# Patient Record
Sex: MALE | Race: WHITE | NOT HISPANIC OR LATINO | Employment: FULL TIME | URBAN - METROPOLITAN AREA
[De-identification: names, ages, dates, MRNs, and addresses within clinical notes are randomized per-mention and may not be internally consistent; named-entity substitution may affect disease eponyms.]

---

## 2018-02-09 DIAGNOSIS — Z20.821 ZIKA VIRUS EXPOSURE: Primary | ICD-10-CM

## 2018-02-17 LAB
ZIKA VIRUS, NAA, SERUM: NEGATIVE
ZIKA VIRUS, NAA, URINE: NEGATIVE

## 2019-08-09 ENCOUNTER — OFFICE VISIT (OUTPATIENT)
Dept: FAMILY MEDICINE CLINIC | Facility: CLINIC | Age: 39
End: 2019-08-09

## 2019-08-09 VITALS
HEART RATE: 76 BPM | TEMPERATURE: 97.6 F | SYSTOLIC BLOOD PRESSURE: 104 MMHG | BODY MASS INDEX: 29.73 KG/M2 | WEIGHT: 212.4 LBS | DIASTOLIC BLOOD PRESSURE: 76 MMHG | OXYGEN SATURATION: 99 % | HEIGHT: 71 IN

## 2019-08-09 DIAGNOSIS — L98.9 SKIN LESION: ICD-10-CM

## 2019-08-09 DIAGNOSIS — Z00.00 HEALTH CARE MAINTENANCE: Primary | ICD-10-CM

## 2019-08-09 PROCEDURE — 99385 PREV VISIT NEW AGE 18-39: CPT | Performed by: FAMILY MEDICINE

## 2019-08-09 NOTE — PROGRESS NOTES
Subjective   Suresh Zeng is a 38 y.o. male.         Chief Complaint   Patient presents with   • Establish Care     New pt   • Annual Exam        History of Present Illness     New patient.  Getting reestablished.    Here for annual complete physical examination.  He has 2 children.  He has not been exercising as much as he used to.  But he just bought a treadmill.  He is ready to lose some weight.  His wife wanted him to be reestablished with a doctor.  He states he used to drink alcohol more heavily.  Now he drinks about 10 drinks a week, usually just on the weekends.  His LFTs were previously very elevated.  I saw him for 5 years ago, I counseled alcohol cessation.    Family history of multiple myeloma.    His immunizations: Tdap is up-to-date.      Social History     Tobacco Use   • Smoking status: Former Smoker     Types: Cigarettes     Last attempt to quit: 2006     Years since quittin.6   • Smokeless tobacco: Never Used   Substance Use Topics   • Alcohol use: Yes     Alcohol/week: 6.0 oz     Types: 10 Standard drinks or equivalent per week   • Drug use: No     Immunization History   Administered Date(s) Administered   • Hepatitis B 2014   • Tdap 2014     Family History   Problem Relation Age of Onset   • Multiple myeloma Mother    • Irregular heart beat Father          The following portions of the patient's history were reviewed and updated as appropriate: allergies, current medications, past family history, past medical history, past social history, past surgical history and problem list.          Review of Systems   Constitutional: Negative.    HENT: Negative.    Respiratory: Negative.    Cardiovascular: Negative.    Gastrointestinal: Negative.  Negative for blood in stool.   Endocrine: Negative.    Genitourinary: Negative.  Negative for hematuria.   Musculoskeletal: Negative.    Skin: Negative.    Neurological: Negative.  Negative for headaches.   Psychiatric/Behavioral: Negative.   "  All other systems reviewed and are negative.      Objective   Blood pressure 104/76, pulse 76, temperature 97.6 °F (36.4 °C), temperature source Oral, height 180.3 cm (71\"), weight 96.3 kg (212 lb 6.4 oz), SpO2 99 %.  Physical Exam   Constitutional: He is oriented to person, place, and time. He appears well-developed and well-nourished. No distress.   HENT:   Head: Atraumatic.   Right Ear: Tympanic membrane, external ear and ear canal normal.   Left Ear: Tympanic membrane, external ear and ear canal normal.   Nose: Nose normal.   Mouth/Throat: Oropharynx is clear and moist. No oropharyngeal exudate.   Eyes: Conjunctivae are normal. Right eye exhibits no discharge. Left eye exhibits no discharge. No scleral icterus.   Neck: Normal range of motion. Neck supple. No thyromegaly present.   Cardiovascular: Normal rate, regular rhythm, normal heart sounds and intact distal pulses.   Pulmonary/Chest: Effort normal and breath sounds normal. No respiratory distress. He has no wheezes. He has no rales.   Abdominal: Soft. Bowel sounds are normal. He exhibits no distension. There is no tenderness.   Musculoskeletal: He exhibits no edema.   Lymphadenopathy:     He has no cervical adenopathy.   Neurological: He is alert and oriented to person, place, and time. He exhibits normal muscle tone. Coordination normal.   Skin: Skin is warm and dry. No rash noted.   There is a macular pigmented lesion of his left upper abdomen the patient states is been there for a number of years.  He thinks it might be a little bit bigger, but it could be related to his weight.  It is greater than 6 mm.  Has irregular borders.  It has 2 different colors.   Psychiatric: He has a normal mood and affect. His behavior is normal. Judgment and thought content normal.   Nursing note and vitals reviewed.      Assessment/Plan   Suresh was seen today for establish care and annual exam.    Diagnoses and all orders for this visit:    Health care maintenance  -   "   CBC & Differential  -     Comprehensive Metabolic Panel  -     Lipid Panel  -     Urinalysis With Microscopic If Indicated (No Culture) - Urine, Clean Catch    Skin lesion  -     Ambulatory Referral to Dermatology    Annual wellness visit.    Immunizations.  Tdap up-to-date.    Skin lesion left abdomen.  Probable benign nevus.  However I do recommend a second opinion from a dermatologist.  It has some irregular features.  Referral has been made.  Also gave the patient the name of the dermatologist I am referring to.  He can also call them on his own.    Previous elevated liver enzymes, likely related to heavier alcohol use.  He states he is cut far back on his alcohol use now.  Alcohol moderation not cessation recommended.  Rechecking LFTs today along with a CMP.    Family history multiple myeloma.  We will monitor blood work including calcium and CBCs.    Health safety issues discussed including alcohol use as above.  Also I recommend regular exercise and lower carbohydrate diet.

## 2019-08-10 LAB
ALBUMIN SERPL-MCNC: 4.5 G/DL (ref 3.5–5.2)
ALBUMIN/GLOB SERPL: 1.3 G/DL
ALP SERPL-CCNC: 73 U/L (ref 39–117)
ALT SERPL-CCNC: 30 U/L (ref 1–41)
APPEARANCE UR: CLEAR
AST SERPL-CCNC: 25 U/L (ref 1–40)
BASOPHILS # BLD AUTO: 0.03 10*3/MM3 (ref 0–0.2)
BASOPHILS NFR BLD AUTO: 0.5 % (ref 0–1.5)
BILIRUB SERPL-MCNC: 0.5 MG/DL (ref 0.2–1.2)
BILIRUB UR QL STRIP: NEGATIVE
BUN SERPL-MCNC: 10 MG/DL (ref 6–20)
BUN/CREAT SERPL: 10.8 (ref 7–25)
CALCIUM SERPL-MCNC: 9.1 MG/DL (ref 8.6–10.5)
CHLORIDE SERPL-SCNC: 99 MMOL/L (ref 98–107)
CHOLEST SERPL-MCNC: 170 MG/DL (ref 0–200)
CO2 SERPL-SCNC: 27.6 MMOL/L (ref 22–29)
COLOR UR: YELLOW
CREAT SERPL-MCNC: 0.93 MG/DL (ref 0.76–1.27)
EOSINOPHIL # BLD AUTO: 0.19 10*3/MM3 (ref 0–0.4)
EOSINOPHIL NFR BLD AUTO: 2.9 % (ref 0.3–6.2)
ERYTHROCYTE [DISTWIDTH] IN BLOOD BY AUTOMATED COUNT: 13.6 % (ref 12.3–15.4)
GLOBULIN SER CALC-MCNC: 3.5 GM/DL
GLUCOSE SERPL-MCNC: 94 MG/DL (ref 65–99)
GLUCOSE UR QL: NEGATIVE
HCT VFR BLD AUTO: 44.1 % (ref 37.5–51)
HDLC SERPL-MCNC: 53 MG/DL (ref 40–60)
HGB BLD-MCNC: 14.6 G/DL (ref 13–17.7)
HGB UR QL STRIP: NEGATIVE
IMM GRANULOCYTES # BLD AUTO: 0.02 10*3/MM3 (ref 0–0.05)
IMM GRANULOCYTES NFR BLD AUTO: 0.3 % (ref 0–0.5)
KETONES UR QL STRIP: NEGATIVE
LDLC SERPL CALC-MCNC: 100 MG/DL (ref 0–100)
LEUKOCYTE ESTERASE UR QL STRIP: NEGATIVE
LYMPHOCYTES # BLD AUTO: 1.48 10*3/MM3 (ref 0.7–3.1)
LYMPHOCYTES NFR BLD AUTO: 22.8 % (ref 19.6–45.3)
MCH RBC QN AUTO: 29.6 PG (ref 26.6–33)
MCHC RBC AUTO-ENTMCNC: 33.1 G/DL (ref 31.5–35.7)
MCV RBC AUTO: 89.3 FL (ref 79–97)
MONOCYTES # BLD AUTO: 0.73 10*3/MM3 (ref 0.1–0.9)
MONOCYTES NFR BLD AUTO: 11.2 % (ref 5–12)
NEUTROPHILS # BLD AUTO: 4.05 10*3/MM3 (ref 1.7–7)
NEUTROPHILS NFR BLD AUTO: 62.3 % (ref 42.7–76)
NITRITE UR QL STRIP: NEGATIVE
NRBC BLD AUTO-RTO: 0 /100 WBC (ref 0–0.2)
PH UR STRIP: 7 [PH] (ref 5–8)
PLATELET # BLD AUTO: 292 10*3/MM3 (ref 140–450)
POTASSIUM SERPL-SCNC: 4.3 MMOL/L (ref 3.5–5.2)
PROT SERPL-MCNC: 8 G/DL (ref 6–8.5)
PROT UR QL STRIP: NEGATIVE
RBC # BLD AUTO: 4.94 10*6/MM3 (ref 4.14–5.8)
SODIUM SERPL-SCNC: 138 MMOL/L (ref 136–145)
SP GR UR: 1.01 (ref 1–1.03)
TRIGL SERPL-MCNC: 85 MG/DL (ref 0–150)
UROBILINOGEN UR STRIP-MCNC: NORMAL MG/DL
VLDLC SERPL CALC-MCNC: 17 MG/DL
WBC # BLD AUTO: 6.5 10*3/MM3 (ref 3.4–10.8)

## 2019-10-22 ENCOUNTER — OFFICE VISIT (OUTPATIENT)
Dept: FAMILY MEDICINE CLINIC | Facility: CLINIC | Age: 39
End: 2019-10-22

## 2019-10-22 VITALS
HEART RATE: 85 BPM | WEIGHT: 211.1 LBS | BODY MASS INDEX: 29.55 KG/M2 | TEMPERATURE: 99.1 F | HEIGHT: 71 IN | DIASTOLIC BLOOD PRESSURE: 81 MMHG | SYSTOLIC BLOOD PRESSURE: 116 MMHG | OXYGEN SATURATION: 98 %

## 2019-10-22 DIAGNOSIS — J02.9 SORE THROAT: Primary | ICD-10-CM

## 2019-10-22 LAB
EXPIRATION DATE: NORMAL
INTERNAL CONTROL: NORMAL
Lab: NORMAL
S PYO AG THROAT QL: NEGATIVE

## 2019-10-22 PROCEDURE — 99213 OFFICE O/P EST LOW 20 MIN: CPT | Performed by: FAMILY MEDICINE

## 2019-10-22 PROCEDURE — 87880 STREP A ASSAY W/OPTIC: CPT | Performed by: FAMILY MEDICINE

## 2019-10-22 NOTE — PROGRESS NOTES
"Subjective   Suresh Zeng is a 39 y.o. male.     Chief Complaint   Patient presents with   • Sore Throat     x last night    • Earache     left ear        History of Present Illness    Last night.  Sudden onset left sided mild to moderate tonsil pain.  Sore throat.  May be a swollen gland.  Some left ear pain.  No fevers.  No chills.  No muscle aches.  No runny nose.  No cough.  Daughter was sick with human metapneumovirus last week and was hospitalized.  He had some stressors then but doing better now.  Daughter is home and doing well reportedly.  He states he had something similar may be a year ago.  Negative strep test.  Given a Z-Eloy.      The following portions of the patient's history were reviewed and updated as appropriate: allergies, current medications, past family history, past medical history, past social history, past surgical history and problem list.          Review of Systems   Constitutional: Negative.    HENT: Positive for ear pain and sore throat. Negative for congestion.    Respiratory: Negative.  Negative for cough.    Cardiovascular: Negative.    Musculoskeletal: Negative.  Negative for arthralgias and myalgias.       Objective   Blood pressure 116/81, pulse 85, temperature 99.1 °F (37.3 °C), temperature source Oral, height 180.3 cm (70.98\"), weight 95.8 kg (211 lb 1.6 oz), SpO2 98 %.  Physical Exam   HENT:   Right Ear: External ear normal.   Left Ear: External ear normal.   Nose: Nose normal.   Mouth/Throat: Oropharynx is clear and moist. No oropharyngeal exudate.   TMs are clear bilaterally.  The tonsils are +2 size pink and noninflamed.  I see no ulcerations or exudates.  There is no peritonsillar mass.  No peritonsillar inflammation.  There is some minimal left anterior cervical lymphadenopathy at the angle of the left mandible.  Otherwise no other lymph nodes.  Including no posterior lymph nodes.   Neck: Normal range of motion. Neck supple.   Cardiovascular: Normal rate. "   Pulmonary/Chest: Effort normal and breath sounds normal. No respiratory distress.   Lymphadenopathy:     He has cervical adenopathy.       Assessment/Plan   Suresh was seen today for sore throat and earache.    Diagnoses and all orders for this visit:    Sore throat  -     POC Rapid Strep A      Sore throat.  Probable viral evolving tonsillitis.  Rapid strep is negative.  Exam today is otherwise unremarkable with exception of some adenopathy.  I am recommending observation.  Over-the-counter Aleve.  Salt water gargles.  With worse symptoms later this week he is going to let us know, especially if he has severe shaking chills.  Otherwise should get better on its own.  At this time empiric antibiotic use risk outweighs benefits.

## 2021-12-07 ENCOUNTER — OFFICE VISIT (OUTPATIENT)
Dept: FAMILY MEDICINE CLINIC | Facility: CLINIC | Age: 41
End: 2021-12-07

## 2021-12-07 VITALS
WEIGHT: 221.9 LBS | HEIGHT: 72 IN | BODY MASS INDEX: 30.05 KG/M2 | OXYGEN SATURATION: 100 % | TEMPERATURE: 96.9 F | DIASTOLIC BLOOD PRESSURE: 82 MMHG | HEART RATE: 91 BPM | SYSTOLIC BLOOD PRESSURE: 125 MMHG

## 2021-12-07 DIAGNOSIS — B36.0 TINEA VERSICOLOR: ICD-10-CM

## 2021-12-07 DIAGNOSIS — Z00.00 HEALTH CARE MAINTENANCE: Primary | ICD-10-CM

## 2021-12-07 DIAGNOSIS — Z80.7 FAMILY HISTORY OF MULTIPLE MYELOMA: ICD-10-CM

## 2021-12-07 DIAGNOSIS — Z11.59 NEED FOR HEPATITIS C SCREENING TEST: ICD-10-CM

## 2021-12-07 PROCEDURE — 99396 PREV VISIT EST AGE 40-64: CPT | Performed by: FAMILY MEDICINE

## 2021-12-07 NOTE — PROGRESS NOTES
"Chief Complaint  Annual Exam    Subjective          Suresh Zeng presents to [facility]  History of Present Illness     Annual healthcare maintenance visit.  Non-smoker.  Intermittent alcohol use.  He will have 5 or more drinks at a time when he is at a ballgame fall and winter.  He states he does not typically drink in the house.  He states he sometimes loses some control with a drinking when he is at the ballgame.  His wife is on his case sometimes.  There is a remote history of possible alcoholism, and a grandfather.  No first-degree relatives with alcohol use disorder.  His liver enzymes been elevated in the past with 2 years ago at last check liver enzymes are normal.  He is not exercising as much as he used to.  Although he does get 15,000 steps a day at work.  He does have a treadmill available at home.  Family history of multiple myeloma.  Mother has multiple myeloma treated at [facility].  Sister has \"pre myeloma\".  [facility] has sent out a kit to him to get tested.  As part of a study reportedly.  Some intermittent acid reflux.  No dysphagia.  Takes occasional Prevacid.  No changes in the stool.  No blood in the stool.  No  changes other than nocturia x1.  No blood in the urine.     Objective   Vital Signs:   /82   Pulse 91   Temp 96.9 °F (36.1 °C) (Temporal)   Ht 182.9 cm (72\")   Wt 101 kg (221 lb 14.4 oz)   SpO2 100%   BMI 30.10 kg/m²     Physical Exam  Constitutional:       General: He is not in acute distress.     Appearance: He is well-developed.   HENT:      Head: Normocephalic and atraumatic.      Right Ear: Tympanic membrane, ear canal and external ear normal.      Left Ear: Tympanic membrane, ear canal and external ear normal.      Nose: Nose normal.      Mouth/Throat:      Pharynx: No oropharyngeal exudate.   Eyes:      Conjunctiva/sclera: Conjunctivae normal.      Pupils: Pupils are equal, round, and reactive to light.   Neck:      Thyroid: No " thyromegaly.      Trachea: No tracheal deviation.   Cardiovascular:      Rate and Rhythm: Normal rate and regular rhythm.      Heart sounds: Normal heart sounds. No murmur heard.      Pulmonary:      Effort: Pulmonary effort is normal.      Breath sounds: Normal breath sounds.   Abdominal:      General: Bowel sounds are normal. There is no abdominal bruit.      Palpations: Abdomen is soft. There is no mass.      Tenderness: There is no abdominal tenderness.      Hernia: No hernia is present.   Musculoskeletal:         General: Normal range of motion.      Cervical back: Normal range of motion and neck supple.   Lymphadenopathy:      Cervical: No cervical adenopathy.   Skin:     General: Skin is warm.      Findings: Rash ( Tinea versicolor on the back.) present.   Neurological:      Mental Status: He is alert and oriented to person, place, and time.      Motor: No abnormal muscle tone.   Psychiatric:         Behavior: Behavior normal.         Thought Content: Thought content normal.         Judgment: Judgment normal.        Result Review :                   Assessment and Plan    Diagnoses and all orders for this visit:    1. Health care maintenance (Primary)  -     CBC & Differential  -     Comprehensive Metabolic Panel  -     Lipid Panel  -     Urinalysis With Microscopic If Indicated (No Culture) - Urine, Clean Catch  -     Hepatitis C Antibody  -     Protein Elec + Interp, Serum    2. Family history of multiple myeloma  -     CBC & Differential  -     Urinalysis With Microscopic If Indicated (No Culture) - Urine, Clean Catch  -     Protein Elec + Interp, Serum    3. Need for hepatitis C screening test  -     Hepatitis C Antibody    4. Tinea versicolor      Annual healthcare maintenance visit.    Immunizations.  COVID-19 vaccine up-to-date.    Family history multiple myeloma.  Mother with multiple myeloma.  Sister with possible early multiple myeloma.  Screening the patient with an SPEP today.    Intermittent  elevated alcohol use.  At risk for alcohol use disorder.  Counseled patient to be mindful of drinking alcohol.  And to decrease the amount or stop completely.    Colon cancer screening to commence at age 45.  No family history of colon cancer.    Preventative health practices discussed including regular exercise.  I will see him back in 1 year.  Sooner as needed.    Tinea versicolor on the back.  Recommend Selsun Blue shampoo on the skin for 5 minutes a few times a week.  Or no treatment is also an option.  Asymptomatic.  Does not know he has it.          Follow Up   No follow-ups on file.  Patient was given instructions and counseling regarding his condition or for health maintenance advice. Please see specific information pulled into the AVS if appropriate.

## 2021-12-08 DIAGNOSIS — Z80.7 FAMILY HISTORY OF MULTIPLE MYELOMA: Primary | ICD-10-CM

## 2021-12-08 LAB
ALBUMIN SERPL ELPH-MCNC: 4.1 G/DL (ref 2.9–4.4)
ALBUMIN SERPL-MCNC: 4.6 G/DL (ref 4–5)
ALBUMIN/GLOB SERPL: 1 {RATIO} (ref 0.7–1.7)
ALBUMIN/GLOB SERPL: 1.2 {RATIO} (ref 1.2–2.2)
ALP SERPL-CCNC: 82 IU/L (ref 44–121)
ALPHA1 GLOB SERPL ELPH-MCNC: 0.2 G/DL (ref 0–0.4)
ALPHA2 GLOB SERPL ELPH-MCNC: 0.7 G/DL (ref 0.4–1)
ALT SERPL-CCNC: 79 IU/L (ref 0–44)
APPEARANCE UR: CLEAR
AST SERPL-CCNC: 69 IU/L (ref 0–40)
B-GLOBULIN SERPL ELPH-MCNC: 1.7 G/DL (ref 0.7–1.3)
BASOPHILS # BLD AUTO: 0 X10E3/UL (ref 0–0.2)
BASOPHILS NFR BLD AUTO: 1 %
BILIRUB SERPL-MCNC: 0.5 MG/DL (ref 0–1.2)
BILIRUB UR QL STRIP: NEGATIVE
BUN SERPL-MCNC: 14 MG/DL (ref 6–24)
BUN/CREAT SERPL: 15 (ref 9–20)
CALCIUM SERPL-MCNC: 9.6 MG/DL (ref 8.7–10.2)
CHLORIDE SERPL-SCNC: 103 MMOL/L (ref 96–106)
CHOLEST SERPL-MCNC: 207 MG/DL (ref 100–199)
CO2 SERPL-SCNC: 23 MMOL/L (ref 20–29)
COLOR UR: YELLOW
CREAT SERPL-MCNC: 0.94 MG/DL (ref 0.76–1.27)
EOSINOPHIL # BLD AUTO: 0.3 X10E3/UL (ref 0–0.4)
EOSINOPHIL NFR BLD AUTO: 6 %
ERYTHROCYTE [DISTWIDTH] IN BLOOD BY AUTOMATED COUNT: 13.6 % (ref 11.6–15.4)
GAMMA GLOB SERPL ELPH-MCNC: 1.5 G/DL (ref 0.4–1.8)
GLOBULIN SER CALC-MCNC: 3.7 G/DL (ref 1.5–4.5)
GLOBULIN SER CALC-MCNC: 4.2 G/DL (ref 2.2–3.9)
GLUCOSE SERPL-MCNC: 98 MG/DL (ref 65–99)
GLUCOSE UR QL: NEGATIVE
HCT VFR BLD AUTO: 39.4 % (ref 37.5–51)
HCV AB S/CO SERPL IA: <0.1 S/CO RATIO (ref 0–0.9)
HDLC SERPL-MCNC: 56 MG/DL
HGB BLD-MCNC: 12.8 G/DL (ref 13–17.7)
HGB UR QL STRIP: NEGATIVE
IMM GRANULOCYTES # BLD AUTO: 0 X10E3/UL (ref 0–0.1)
IMM GRANULOCYTES NFR BLD AUTO: 0 %
KETONES UR QL STRIP: NEGATIVE
LABORATORY COMMENT REPORT: ABNORMAL
LDLC SERPL CALC-MCNC: 136 MG/DL (ref 0–99)
LEUKOCYTE ESTERASE UR QL STRIP: NEGATIVE
LYMPHOCYTES # BLD AUTO: 1.2 X10E3/UL (ref 0.7–3.1)
LYMPHOCYTES NFR BLD AUTO: 22 %
M PROTEIN SERPL ELPH-MCNC: ABNORMAL G/DL
MCH RBC QN AUTO: 26.8 PG (ref 26.6–33)
MCHC RBC AUTO-ENTMCNC: 32.5 G/DL (ref 31.5–35.7)
MCV RBC AUTO: 82 FL (ref 79–97)
MICRO URNS: NORMAL
MONOCYTES # BLD AUTO: 0.9 X10E3/UL (ref 0.1–0.9)
MONOCYTES NFR BLD AUTO: 16 %
NEUTROPHILS # BLD AUTO: 3.1 X10E3/UL (ref 1.4–7)
NEUTROPHILS NFR BLD AUTO: 55 %
NITRITE UR QL STRIP: NEGATIVE
PH UR STRIP: 5 [PH] (ref 5–7.5)
PLATELET # BLD AUTO: 377 X10E3/UL (ref 150–450)
POTASSIUM SERPL-SCNC: 4.7 MMOL/L (ref 3.5–5.2)
PROT PATTERN SERPL ELPH-IMP: ABNORMAL
PROT SERPL-MCNC: 8.3 G/DL (ref 6–8.5)
PROT UR QL STRIP: NEGATIVE
RBC # BLD AUTO: 4.78 X10E6/UL (ref 4.14–5.8)
SODIUM SERPL-SCNC: 139 MMOL/L (ref 134–144)
SP GR UR: 1.02 (ref 1–1.03)
TRIGL SERPL-MCNC: 81 MG/DL (ref 0–149)
UROBILINOGEN UR STRIP-MCNC: 0.2 MG/DL (ref 0.2–1)
VLDLC SERPL CALC-MCNC: 15 MG/DL (ref 5–40)
WBC # BLD AUTO: 5.6 X10E3/UL (ref 3.4–10.8)

## 2022-01-05 DIAGNOSIS — D47.2 IGA GAMMOPATHY: Primary | ICD-10-CM

## 2022-01-11 NOTE — PROGRESS NOTES
.     REASON FOR CONSULTATION:   Family history of multiple myeloma.  Screening serum VAUGHN: IgA elevated but no monoclonality.  Provide an opinion on any further workup or treatment                             REQUESTING PHYSICIAN: Rod Connell MD  RECORDS OBTAINED:  Records of the patient's history including those obtained from the referring provider were reviewed and summarized in detail.    HISTORY OF PRESENT ILLNESS:  The patient is a 41 y.o. year old male  who is here for follow-up with the above-mentioned history.    Reviewed PCP, Dr. Connell, last note, 12/7/2021.  That was an annual healthcare maintenance visit.  Intermittent alcohol use.  5 or more drinks at a time when at a ball game with fall and winter.  Does not typically drink at home.  Not exercising as much as he used to.  Family history of multiple myeloma.  Mother has multiple myeloma, treated at [facility].  Sister has smoldering myeloma.  [facility] has sent out a kit to get him tested as part of a study.    Dr. Retana did a serum immunofixation which shows no monoclonality but it does show an elevated IgA.    Denies fever, chills, weight loss, night sweats.    He is mildly anemic both today and on recent labs.  Also, microcytic.  Hb and MCV normal in 2019.  States for the past 2 years or so he has been chewing ice regularly.    Denies chest pain, SOA.  Denies bleeding.  Has never had a colonoscopy or EGD.  Has never been on oral iron.    Past Medical History:   Diagnosis Date   • Family history of multiple myeloma    • No pertinent past medical history      Past Surgical History:   Procedure Laterality Date   • EAR TUBES     • NOSE SURGERY     • WISDOM TOOTH EXTRACTION         MEDICATIONS  No current outpatient medications on file.    ALLERGIES:   No Known Allergies    SOCIAL HISTORY:       Social History     Socioeconomic History   • Marital status:      Spouse name: Elise   Tobacco Use   • Smoking status: Former Smoker     Types:  "Cigarettes     Quit date:      Years since quittin.0   • Smokeless tobacco: Never Used   Substance and Sexual Activity   • Alcohol use: Yes     Alcohol/week: 10.0 standard drinks     Types: 10 Standard drinks or equivalent per week   • Drug use: No         FAMILY HISTORY:  Family History   Problem Relation Age of Onset   • Multiple myeloma Mother    • Irregular heart beat Father    • Cancer Other    • COPD Other    • Nephrolithiasis Other    • Other Sister         Possible pre-myeloma       REVIEW OF SYSTEMS:  Review of Systems   Constitutional: Negative for activity change.   HENT: Negative for nosebleeds and trouble swallowing.    Respiratory: Negative for shortness of breath and wheezing.    Cardiovascular: Negative for chest pain and palpitations.   Gastrointestinal: Negative for constipation, diarrhea and nausea.   Genitourinary: Negative for dysuria and hematuria.   Musculoskeletal: Negative for arthralgias and myalgias.   Neurological: Negative for seizures and syncope.   Hematological: Negative for adenopathy. Does not bruise/bleed easily.   Psychiatric/Behavioral: Negative for confusion.              Vitals:    22 0856   BP: 130/89   Pulse: 90   Resp: 18   Temp: 97.1 °F (36.2 °C)   TempSrc: Temporal   SpO2: 97%   Weight: 98.3 kg (216 lb 11.2 oz)   Height: 182.9 cm (72\")   PainSc: 0-No pain     Current Status 2022   ECOG score 0      PHYSICAL EXAM:      CONSTITUTIONAL:  Vital signs reviewed.  No distress, looks comfortable.  EYES:  Conjunctivae and lids unremarkable.  PERRLA  EARS,NOSE,MOUTH,THROAT:  Ears and nose appear unremarkable.  Lips, teeth, gums appear unremarkable.  RESPIRATORY:  Normal respiratory effort.  Lungs clear to auscultation bilaterally.  CARDIOVASCULAR:  Normal S1, S2.  No murmurs rubs or gallops.  No significant lower extremity edema.  GASTROINTESTINAL: Abdomen appears unremarkable.  Nontender.  No hepatomegaly.  No splenomegaly.  LYMPHATIC:  No cervical, " supraclavicular, axillary lymphadenopathy.  MUSCULOSKELETAL:  Unremarkable gait and station.  Unremarkable digits/nails.  No cyanosis or clubbing.  SKIN:  Warm.  No rashes.  PSYCHIATRIC:  Normal judgment and insight.  Normal mood and affect.      RECENT LABS:        WBC   Date Value Ref Range Status   01/13/2022 6.58 3.40 - 10.80 10*3/mm3 Final   12/07/2021 5.6 3.4 - 10.8 x10E3/uL Final   08/09/2019 6.50 3.40 - 10.80 10*3/mm3 Final   07/31/2015 6.18 4.50 - 10.70 K/Cumm Final   11/15/2014 7.88 4.50 - 10.70 K/Cumm Final     Hemoglobin   Date Value Ref Range Status   01/13/2022 12.8 (L) 13.0 - 17.7 g/dL Final   12/07/2021 12.8 (L) 13.0 - 17.7 g/dL Final   08/09/2019 14.6 13.0 - 17.7 g/dL Final   07/31/2015 14.4 13.7 - 17.6 g/dL Final   11/15/2014 15.3 13.7 - 17.6 g/dL Final     Platelets   Date Value Ref Range Status   01/13/2022 341 140 - 450 10*3/mm3 Final   12/07/2021 377 150 - 450 x10E3/uL Final   08/09/2019 292 140 - 450 10*3/mm3 Final   07/31/2015 268 140 - 500 K/Cumm Final   11/15/2014 290 140 - 500 K/Cumm Final       Assessment/Plan   Anemia, unspecified type  - Ferritin  - Iron Profile  - Retic With IRF & RET-He  - Ferritin  - Iron Profile  - Retic With IRF & RET-He  - CBC & Differential  - VAUGHN,PE and FLC, Serum  - CBC & Differential  - Retic With IRF & RET-He  - VAUGHN, PE & Free LT Chains, Ser  - Ferritin  - Iron Profile        Suresh Zeng   *Family history of myeloma  · Mother with multiple myeloma, treated at [facility].    · Sister has smoldering myeloma     *Polyclonal elevated IgA  · Serum immunofixation 1/3/2022: No monoclonality.  IgA 1028 (normal range ).  IgG and IgM normal.  Both free serum kappa and free serum lambda are elevated but serum free light chain ratio is normal.  · COVID-19 infection a week or 2 prior to the lab work with the significantly elevated IgA.    *Iron deficiency anemia  · Newly found at consult, 1/13/2022.  · Hb normal through 8/9/2019.  · Hb 12.8 on 12/7/2021 with MCV  82.  · 1/13/2022: Ferritin 14, 5% saturation.  · 1/13/2022: Begin oral iron    *Source of iron deficiency  · Referral for GI evaluation    *Ice cravings since around 2020  · Suspect this will resolve with correction of iron deficiency    Plan  · Begin oral iron  · Referral for GI evaluation  · MD roughly 2 months.  1 week prior: Ferritin, iron panel, CBC, reticulate hemoglobin, SPEP, S VAUGHN, serum free light chains  · Suspect the significantly elevated IgA (polyclonal), was due to recent COVID-19 infection.  Hopefully, will improve in several weeks.  However, if it remains significantly elevated along with his family history, may check a 24-hour urine at that time.

## 2022-01-13 ENCOUNTER — CONSULT (OUTPATIENT)
Dept: ONCOLOGY | Facility: CLINIC | Age: 42
End: 2022-01-13

## 2022-01-13 ENCOUNTER — LAB (OUTPATIENT)
Dept: OTHER | Facility: HOSPITAL | Age: 42
End: 2022-01-13

## 2022-01-13 VITALS
HEIGHT: 72 IN | BODY MASS INDEX: 29.35 KG/M2 | HEART RATE: 90 BPM | SYSTOLIC BLOOD PRESSURE: 130 MMHG | TEMPERATURE: 97.1 F | OXYGEN SATURATION: 97 % | RESPIRATION RATE: 18 BRPM | DIASTOLIC BLOOD PRESSURE: 89 MMHG | WEIGHT: 216.7 LBS

## 2022-01-13 DIAGNOSIS — D47.2 IGA GAMMOPATHY: Primary | ICD-10-CM

## 2022-01-13 DIAGNOSIS — D64.9 ANEMIA, UNSPECIFIED TYPE: Primary | ICD-10-CM

## 2022-01-13 PROBLEM — D64.89 OTHER SPECIFIED ANEMIAS: Status: ACTIVE | Noted: 2022-01-13

## 2022-01-13 LAB
BASOPHILS # BLD AUTO: 0.03 10*3/MM3 (ref 0–0.2)
BASOPHILS NFR BLD AUTO: 0.5 % (ref 0–1.5)
DEPRECATED RDW RBC AUTO: 42.6 FL (ref 37–54)
EOSINOPHIL # BLD AUTO: 0.45 10*3/MM3 (ref 0–0.4)
EOSINOPHIL NFR BLD AUTO: 6.8 % (ref 0.3–6.2)
ERYTHROCYTE [DISTWIDTH] IN BLOOD BY AUTOMATED COUNT: 14.7 % (ref 12.3–15.4)
FERRITIN SERPL-MCNC: 14.2 NG/ML (ref 30–400)
HCT VFR BLD AUTO: 39.9 % (ref 37.5–51)
HGB BLD-MCNC: 12.8 G/DL (ref 13–17.7)
HGB RETIC QN AUTO: 29.6 PG (ref 29.8–36.1)
IMM GRANULOCYTES # BLD AUTO: 0.02 10*3/MM3 (ref 0–0.05)
IMM GRANULOCYTES NFR BLD AUTO: 0.3 % (ref 0–0.5)
IMM RETICS NFR: 13.5 % (ref 3–15.8)
IRON 24H UR-MRATE: 22 MCG/DL (ref 59–158)
IRON SATN MFR SERPL: 5 % (ref 20–50)
LYMPHOCYTES # BLD AUTO: 1.28 10*3/MM3 (ref 0.7–3.1)
LYMPHOCYTES NFR BLD AUTO: 19.5 % (ref 19.6–45.3)
MCH RBC QN AUTO: 25.8 PG (ref 26.6–33)
MCHC RBC AUTO-ENTMCNC: 32.1 G/DL (ref 31.5–35.7)
MCV RBC AUTO: 80.3 FL (ref 79–97)
MONOCYTES # BLD AUTO: 0.88 10*3/MM3 (ref 0.1–0.9)
MONOCYTES NFR BLD AUTO: 13.4 % (ref 5–12)
NEUTROPHILS NFR BLD AUTO: 3.92 10*3/MM3 (ref 1.7–7)
NEUTROPHILS NFR BLD AUTO: 59.5 % (ref 42.7–76)
NRBC BLD AUTO-RTO: 0 /100 WBC (ref 0–0.2)
PLATELET # BLD AUTO: 341 10*3/MM3 (ref 140–450)
PMV BLD AUTO: 10.3 FL (ref 6–12)
RBC # BLD AUTO: 4.97 10*6/MM3 (ref 4.14–5.8)
RETICS # AUTO: 0.06 10*6/MM3 (ref 0.02–0.13)
RETICS/RBC NFR AUTO: 1.19 % (ref 0.7–1.9)
TIBC SERPL-MCNC: 456 MCG/DL (ref 298–536)
TRANSFERRIN SERPL-MCNC: 306 MG/DL (ref 200–360)
WBC NRBC COR # BLD: 6.58 10*3/MM3 (ref 3.4–10.8)

## 2022-01-13 PROCEDURE — 99204 OFFICE O/P NEW MOD 45 MIN: CPT | Performed by: INTERNAL MEDICINE

## 2022-01-13 PROCEDURE — 82728 ASSAY OF FERRITIN: CPT | Performed by: INTERNAL MEDICINE

## 2022-01-13 PROCEDURE — 36415 COLL VENOUS BLD VENIPUNCTURE: CPT

## 2022-01-13 PROCEDURE — 85046 RETICYTE/HGB CONCENTRATE: CPT | Performed by: INTERNAL MEDICINE

## 2022-01-13 PROCEDURE — 83540 ASSAY OF IRON: CPT | Performed by: INTERNAL MEDICINE

## 2022-01-13 PROCEDURE — 84466 ASSAY OF TRANSFERRIN: CPT | Performed by: INTERNAL MEDICINE

## 2022-01-13 PROCEDURE — 85025 COMPLETE CBC W/AUTO DIFF WBC: CPT | Performed by: INTERNAL MEDICINE

## 2022-01-14 ENCOUNTER — TELEPHONE (OUTPATIENT)
Dept: ONCOLOGY | Facility: CLINIC | Age: 42
End: 2022-01-14

## 2022-01-14 NOTE — TELEPHONE ENCOUNTER
----- Message from Sridhar Navarro II, MD sent at 1/13/2022 10:57 AM EST -----  April, please call him and tell him he is indeed definitely low on iron.  Ask him to begin those iron pills that we discussed today.  He should have received an iron handout that I created today.  Please confirm he received this.Needs referral to Dr. Cade or any of his partners for GI evaluation for new diagnosis of iron deficiency anemia.      Thanks!

## 2022-01-14 NOTE — TELEPHONE ENCOUNTER
Pt states he is taking iron as prescribed on handout. He started taking on 01/14. Informed he should receive a call from Dr. Cade for GI evaluation for newly dx MARCOS.

## 2022-03-07 ENCOUNTER — LAB (OUTPATIENT)
Dept: OTHER | Facility: HOSPITAL | Age: 42
End: 2022-03-07

## 2022-03-07 DIAGNOSIS — D64.9 ANEMIA, UNSPECIFIED TYPE: ICD-10-CM

## 2022-03-07 LAB
BASOPHILS # BLD AUTO: 0.02 10*3/MM3 (ref 0–0.2)
BASOPHILS NFR BLD AUTO: 0.3 % (ref 0–1.5)
DEPRECATED RDW RBC AUTO: 51.7 FL (ref 37–54)
EOSINOPHIL # BLD AUTO: 0.34 10*3/MM3 (ref 0–0.4)
EOSINOPHIL NFR BLD AUTO: 4.7 % (ref 0.3–6.2)
ERYTHROCYTE [DISTWIDTH] IN BLOOD BY AUTOMATED COUNT: 17.8 % (ref 12.3–15.4)
FERRITIN SERPL-MCNC: 68.9 NG/ML (ref 30–400)
HCT VFR BLD AUTO: 43.6 % (ref 37.5–51)
HGB BLD-MCNC: 14.1 G/DL (ref 13–17.7)
HGB RETIC QN AUTO: 35.9 PG (ref 29.8–36.1)
IMM GRANULOCYTES # BLD AUTO: 0.02 10*3/MM3 (ref 0–0.05)
IMM GRANULOCYTES NFR BLD AUTO: 0.3 % (ref 0–0.5)
IMM RETICS NFR: 20.9 % (ref 3–15.8)
IRON 24H UR-MRATE: 301 MCG/DL (ref 59–158)
IRON SATN MFR SERPL: 78 % (ref 20–50)
LYMPHOCYTES # BLD AUTO: 1.31 10*3/MM3 (ref 0.7–3.1)
LYMPHOCYTES NFR BLD AUTO: 18 % (ref 19.6–45.3)
MCH RBC QN AUTO: 26.6 PG (ref 26.6–33)
MCHC RBC AUTO-ENTMCNC: 32.3 G/DL (ref 31.5–35.7)
MCV RBC AUTO: 82.1 FL (ref 79–97)
MONOCYTES # BLD AUTO: 0.86 10*3/MM3 (ref 0.1–0.9)
MONOCYTES NFR BLD AUTO: 11.8 % (ref 5–12)
NEUTROPHILS NFR BLD AUTO: 4.74 10*3/MM3 (ref 1.7–7)
NEUTROPHILS NFR BLD AUTO: 64.9 % (ref 42.7–76)
NRBC BLD AUTO-RTO: 0 /100 WBC (ref 0–0.2)
PLATELET # BLD AUTO: 348 10*3/MM3 (ref 140–450)
PMV BLD AUTO: 10.8 FL (ref 6–12)
RBC # BLD AUTO: 5.31 10*6/MM3 (ref 4.14–5.8)
RETICS # AUTO: 0.08 10*6/MM3 (ref 0.02–0.13)
RETICS/RBC NFR AUTO: 1.57 % (ref 0.7–1.9)
TIBC SERPL-MCNC: 386 MCG/DL (ref 298–536)
TRANSFERRIN SERPL-MCNC: 259 MG/DL (ref 200–360)
WBC NRBC COR # BLD: 7.29 10*3/MM3 (ref 3.4–10.8)

## 2022-03-07 PROCEDURE — 84165 PROTEIN E-PHORESIS SERUM: CPT | Performed by: INTERNAL MEDICINE

## 2022-03-07 PROCEDURE — 83540 ASSAY OF IRON: CPT | Performed by: INTERNAL MEDICINE

## 2022-03-07 PROCEDURE — 85046 RETICYTE/HGB CONCENTRATE: CPT | Performed by: INTERNAL MEDICINE

## 2022-03-07 PROCEDURE — 82728 ASSAY OF FERRITIN: CPT | Performed by: INTERNAL MEDICINE

## 2022-03-07 PROCEDURE — 84466 ASSAY OF TRANSFERRIN: CPT | Performed by: INTERNAL MEDICINE

## 2022-03-07 PROCEDURE — 83521 IG LIGHT CHAINS FREE EACH: CPT | Performed by: INTERNAL MEDICINE

## 2022-03-07 PROCEDURE — 36415 COLL VENOUS BLD VENIPUNCTURE: CPT

## 2022-03-07 PROCEDURE — 84155 ASSAY OF PROTEIN SERUM: CPT | Performed by: INTERNAL MEDICINE

## 2022-03-07 PROCEDURE — 86334 IMMUNOFIX E-PHORESIS SERUM: CPT | Performed by: INTERNAL MEDICINE

## 2022-03-07 PROCEDURE — 85025 COMPLETE CBC W/AUTO DIFF WBC: CPT | Performed by: INTERNAL MEDICINE

## 2022-03-09 LAB
ALBUMIN SERPL ELPH-MCNC: 3.8 G/DL (ref 2.9–4.4)
ALBUMIN/GLOB SERPL: 1.1 {RATIO} (ref 0.7–1.7)
ALPHA1 GLOB SERPL ELPH-MCNC: 0.2 G/DL (ref 0–0.4)
ALPHA2 GLOB SERPL ELPH-MCNC: 0.6 G/DL (ref 0.4–1)
B-GLOBULIN SERPL ELPH-MCNC: 1.5 G/DL (ref 0.7–1.3)
GAMMA GLOB SERPL ELPH-MCNC: 1.4 G/DL (ref 0.4–1.8)
GLOBULIN SER-MCNC: 3.8 G/DL (ref 2.2–3.9)
IGA SERPL-MCNC: 998 MG/DL (ref 90–386)
IGG SERPL-MCNC: 1393 MG/DL (ref 603–1613)
IGM SERPL-MCNC: 90 MG/DL (ref 20–172)
INTERPRETATION SERPL IEP-IMP: ABNORMAL
KAPPA LC FREE SER-MCNC: 26 MG/L (ref 3.3–19.4)
KAPPA LC FREE/LAMBDA FREE SER: 0.94 {RATIO} (ref 0.26–1.65)
LABORATORY COMMENT REPORT: ABNORMAL
LAMBDA LC FREE SERPL-MCNC: 27.6 MG/L (ref 5.7–26.3)
M PROTEIN SERPL ELPH-MCNC: ABNORMAL G/DL
PROT SERPL-MCNC: 7.6 G/DL (ref 6–8.5)

## 2022-03-09 NOTE — PROGRESS NOTES
.     REASON FOR FOLLOWUP :   Family history of multiple myeloma.  Screening serum VAUGHN: IgA elevated but no monoclonality.    HISTORY OF PRESENT ILLNESS:  The patient is a 41 y.o. year old male  who is here for follow-up with the above-mentioned history.    No new problems.  No fever, chills, weight loss, night sweats.  States he has cut back significantly on alcohol consumption.  Last month he drank 7 days out of the month compared to basically every day.  However, on days he drinks it is significant, often 20 drinks in the day.  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  No GI side effects.  No bleeding    Past Medical History:   Diagnosis Date   • Family history of multiple myeloma    • No pertinent past medical history      Past Surgical History:   Procedure Laterality Date   • EAR TUBES     • NOSE SURGERY     • WISDOM TOOTH EXTRACTION         MEDICATIONS  No current outpatient medications on file.    ALLERGIES:   No Known Allergies    SOCIAL HISTORY:       Social History     Socioeconomic History   • Marital status:      Spouse name: Elise   Tobacco Use   • Smoking status: Former Smoker     Types: Cigarettes     Quit date:      Years since quittin.2   • Smokeless tobacco: Never Used   Substance and Sexual Activity   • Alcohol use: Yes     Alcohol/week: 10.0 standard drinks     Types: 10 Standard drinks or equivalent per week   • Drug use: No         FAMILY HISTORY:  Family History   Problem Relation Age of Onset   • Multiple myeloma Mother    • Irregular heart beat Father    • Cancer Other    • COPD Other    • Nephrolithiasis Other    • Other Sister         Possible pre-myeloma       REVIEW OF SYSTEMS:  Review of Systems   Constitutional: Negative for activity change.   HENT: Negative for nosebleeds and trouble swallowing.    Respiratory: Negative for shortness of breath and wheezing.    Cardiovascular: Negative for chest pain and  "palpitations.   Gastrointestinal: Negative for constipation, diarrhea and nausea.   Genitourinary: Negative for dysuria and hematuria.   Musculoskeletal: Negative for arthralgias and myalgias.   Neurological: Negative for seizures and syncope.   Hematological: Negative for adenopathy. Does not bruise/bleed easily.   Psychiatric/Behavioral: Negative for confusion.              Vitals:    03/11/22 1128   BP: 134/82   Pulse: 90   Resp: 17   Temp: 97.2 °F (36.2 °C)   TempSrc: Temporal   SpO2: 98%   Weight: 96.7 kg (213 lb 1.6 oz)   Height: 182.9 cm (72.01\")   PainSc: 0-No pain     Current Status 3/11/2022   ECOG score 0      PHYSICAL EXAM:        CONSTITUTIONAL:  Vital signs reviewed.  No distress, looks comfortable.  EYES:  Conjunctiva and lids unremarkable.  PERRLA  EARS,NOSE,MOUTH,THROAT:  Ears and nose appear unremarkable.  Lips, teeth, gums appear unremarkable.  RESPIRATORY:  Normal respiratory effort.  Lungs clear to auscultation bilaterally.  CARDIOVASCULAR:  Normal S1, S2.  No murmurs rubs or gallops.  No significant lower extremity edema.  GASTROINTESTINAL: Abdomen appears unremarkable.  Nontender.  No hepatomegaly.  No splenomegaly.  LYMPHATIC:  No cervical, supraclavicular, axillary lymphadenopathy.  SKIN:  Warm.  No rashes.  PSYCHIATRIC:  Normal judgment and insight.  Normal mood and affect.       RECENT LABS:        WBC   Date Value Ref Range Status   03/07/2022 7.29 3.40 - 10.80 10*3/mm3 Final   01/13/2022 6.58 3.40 - 10.80 10*3/mm3 Final   12/07/2021 5.6 3.4 - 10.8 x10E3/uL Final   08/09/2019 6.50 3.40 - 10.80 10*3/mm3 Final   07/31/2015 6.18 4.50 - 10.70 K/Cumm Final   11/15/2014 7.88 4.50 - 10.70 K/Cumm Final     Hemoglobin   Date Value Ref Range Status   03/07/2022 14.1 13.0 - 17.7 g/dL Final   01/13/2022 12.8 (L) 13.0 - 17.7 g/dL Final   12/07/2021 12.8 (L) 13.0 - 17.7 g/dL Final   08/09/2019 14.6 13.0 - 17.7 g/dL Final   07/31/2015 14.4 13.7 - 17.6 g/dL Final   11/15/2014 15.3 13.7 - 17.6 g/dL Final "     Platelets   Date Value Ref Range Status   03/07/2022 348 140 - 450 10*3/mm3 Final   01/13/2022 341 140 - 450 10*3/mm3 Final   12/07/2021 377 150 - 450 x10E3/uL Final   08/09/2019 292 140 - 450 10*3/mm3 Final   07/31/2015 268 140 - 500 K/Cumm Final   11/15/2014 290 140 - 500 K/Cumm Final       Assessment/Plan   Anemia, unspecified type  - VAUGHN,PE and FLC, Serum  - Ferritin  - Iron Profile  - Retic With IRF & RET-He  - CBC & Differential  - Basic Metabolic Panel        Suresh Zeng   *Family history of myeloma  · Mother with multiple myeloma, treated at [place].    · Sister has smoldering myeloma     *Polyclonal elevated IgA  · Serum immunofixation 1/3/2022: No monoclonality.  IgA 1028 (normal range ).  IgG and IgM normal.  Both free serum kappa and free serum lambda are elevated but serum free light chain ratio is normal.  · COVID-19 infection a week or 2 prior to the lab work with the significantly elevated IgA.  · 3/7/2022: IgA 998.  No M spike on SPEP, no monoclonality on S VAUGHN.  Serum free light chains elevated free kappa at 26 (normal range 3.3-19.4), and elevated free lambda at 27.6 (normal range 5.7-26.3).  Ratio normal at 0.94 (normal range 0.26-1.65).   · 3/11/2022: continues to have an elevated polyclonal IgA.  2 first-degree relatives with multiple myeloma/smoldering myeloma.  Offered 24-hour urine.  However, patient prefers for us to follow blood work which is reasonable.    *Iron deficiency anemia  · Newly found at consult, 1/13/2022.  · Hb normal through 8/9/2019.  · Hb 12.8 on 12/7/2021 with MCV 82.  · 1/13/2022: Ferritin 14, 5% saturation.  · 1/13/2022: Begin oral iron  · 3/7/2022: Ferritin 69, 78% saturation, Hb 14.1.  Continue oral iron daily    *Microcytosis, thought to be due to iron deficiency  · 3/7/2021, after iron replaced, MCV only improved from 80.3 to 82.1.    *Source of iron deficiency  · Referral for GI evaluation  · Was set up to see Aayush Steven NP with general surgery  both on 1/27/2022 and 2/1/2022.  Patient canceled both days, due to unforeseen work obligations.  He wants to reschedule, which we will help him arrange    *Ice cravings since around 2020  · Suspect this will resolve with correction of iron deficiency    *Heavy alcohol use, related to his occupation.  · 3/11/2022: He stated in the month of February he cut back to drinking 7 days/month as compared to previously basically every day per month.  However, on days he drinks, he drinks around 20 alcoholic beverages per day  · He realizes alcohol can affect blood counts and ideally he should keep cutting back    Plan  · Continue oral iron daily  · Referral again for GI evaluation for MARCOS  · MD 6 months.  1 week prior: SPEP, S VAUGHN, serum free light chains, ferritin, iron panel, CBC, reticulate hemoglobin, BMP

## 2022-03-11 ENCOUNTER — OFFICE VISIT (OUTPATIENT)
Dept: ONCOLOGY | Facility: CLINIC | Age: 42
End: 2022-03-11

## 2022-03-11 ENCOUNTER — APPOINTMENT (OUTPATIENT)
Dept: OTHER | Facility: HOSPITAL | Age: 42
End: 2022-03-11

## 2022-03-11 VITALS
HEIGHT: 72 IN | RESPIRATION RATE: 17 BRPM | BODY MASS INDEX: 28.86 KG/M2 | HEART RATE: 90 BPM | TEMPERATURE: 97.2 F | WEIGHT: 213.1 LBS | SYSTOLIC BLOOD PRESSURE: 134 MMHG | DIASTOLIC BLOOD PRESSURE: 82 MMHG | OXYGEN SATURATION: 98 %

## 2022-03-11 DIAGNOSIS — D64.9 ANEMIA, UNSPECIFIED TYPE: Primary | ICD-10-CM

## 2022-03-11 PROCEDURE — 99214 OFFICE O/P EST MOD 30 MIN: CPT | Performed by: INTERNAL MEDICINE

## 2022-07-01 DIAGNOSIS — E78.00 PURE HYPERCHOLESTEROLEMIA: Primary | ICD-10-CM

## 2022-07-01 DIAGNOSIS — D64.9 ANEMIA, UNSPECIFIED TYPE: ICD-10-CM

## 2022-07-02 LAB
ALBUMIN SERPL-MCNC: 4.5 G/DL (ref 4–5)
ALBUMIN/GLOB SERPL: 1.5 {RATIO} (ref 1.2–2.2)
ALP SERPL-CCNC: 66 IU/L (ref 44–121)
ALT SERPL-CCNC: 64 IU/L (ref 0–44)
AST SERPL-CCNC: 43 IU/L (ref 0–40)
BASOPHILS # BLD AUTO: 0 X10E3/UL (ref 0–0.2)
BASOPHILS NFR BLD AUTO: 0 %
BILIRUB SERPL-MCNC: 0.3 MG/DL (ref 0–1.2)
BUN SERPL-MCNC: 13 MG/DL (ref 6–24)
BUN/CREAT SERPL: 14 (ref 9–20)
CALCIUM SERPL-MCNC: 9.4 MG/DL (ref 8.7–10.2)
CHLORIDE SERPL-SCNC: 99 MMOL/L (ref 96–106)
CHOLEST SERPL-MCNC: 153 MG/DL (ref 100–199)
CO2 SERPL-SCNC: 23 MMOL/L (ref 20–29)
CREAT SERPL-MCNC: 0.96 MG/DL (ref 0.76–1.27)
EGFRCR SERPLBLD CKD-EPI 2021: 102 ML/MIN/1.73
EOSINOPHIL # BLD AUTO: 0.2 X10E3/UL (ref 0–0.4)
EOSINOPHIL NFR BLD AUTO: 3 %
ERYTHROCYTE [DISTWIDTH] IN BLOOD BY AUTOMATED COUNT: 13.5 % (ref 11.6–15.4)
GLOBULIN SER CALC-MCNC: 3.1 G/DL (ref 1.5–4.5)
GLUCOSE SERPL-MCNC: 90 MG/DL (ref 65–99)
HCT VFR BLD AUTO: 44 % (ref 37.5–51)
HDLC SERPL-MCNC: 43 MG/DL
HGB BLD-MCNC: 14.5 G/DL (ref 13–17.7)
IMM GRANULOCYTES # BLD AUTO: 0 X10E3/UL (ref 0–0.1)
IMM GRANULOCYTES NFR BLD AUTO: 0 %
LDLC SERPL CALC-MCNC: 95 MG/DL (ref 0–99)
LYMPHOCYTES # BLD AUTO: 2 X10E3/UL (ref 0.7–3.1)
LYMPHOCYTES NFR BLD AUTO: 26 %
MCH RBC QN AUTO: 28.7 PG (ref 26.6–33)
MCHC RBC AUTO-ENTMCNC: 33 G/DL (ref 31.5–35.7)
MCV RBC AUTO: 87 FL (ref 79–97)
MONOCYTES # BLD AUTO: 1 X10E3/UL (ref 0.1–0.9)
MONOCYTES NFR BLD AUTO: 12 %
NEUTROPHILS # BLD AUTO: 4.6 X10E3/UL (ref 1.4–7)
NEUTROPHILS NFR BLD AUTO: 59 %
PLATELET # BLD AUTO: 299 X10E3/UL (ref 150–450)
POTASSIUM SERPL-SCNC: 4.7 MMOL/L (ref 3.5–5.2)
PROT SERPL-MCNC: 7.6 G/DL (ref 6–8.5)
RBC # BLD AUTO: 5.06 X10E6/UL (ref 4.14–5.8)
SODIUM SERPL-SCNC: 135 MMOL/L (ref 134–144)
TRIGL SERPL-MCNC: 76 MG/DL (ref 0–149)
VLDLC SERPL CALC-MCNC: 15 MG/DL (ref 5–40)
WBC # BLD AUTO: 7.9 X10E3/UL (ref 3.4–10.8)

## 2022-07-08 ENCOUNTER — OFFICE VISIT (OUTPATIENT)
Dept: FAMILY MEDICINE CLINIC | Facility: CLINIC | Age: 42
End: 2022-07-08

## 2022-07-08 VITALS
HEART RATE: 96 BPM | OXYGEN SATURATION: 97 % | HEIGHT: 72 IN | BODY MASS INDEX: 29.43 KG/M2 | DIASTOLIC BLOOD PRESSURE: 86 MMHG | WEIGHT: 217.3 LBS | TEMPERATURE: 95.7 F | SYSTOLIC BLOOD PRESSURE: 148 MMHG

## 2022-07-08 DIAGNOSIS — R03.0 ELEVATED BLOOD PRESSURE READING: ICD-10-CM

## 2022-07-08 DIAGNOSIS — K76.0 FATTY LIVER: Primary | ICD-10-CM

## 2022-07-08 PROCEDURE — 99213 OFFICE O/P EST LOW 20 MIN: CPT | Performed by: FAMILY MEDICINE

## 2022-07-08 NOTE — PROGRESS NOTES
"Chief Complaint  Hypertension and Anxiety    Subjective        Suresh Zeng presents to [facility]  History of Present Illness     Follow-up elevated liver enzymes and some elevated blood pressure readings.  Some anxiety but not overwhelming.  PHQ-9 score is 0.  ENRIQUETA-7 score is 4.  Heavy alcohol use.  He is cut back his alcohol use.  His liver enzymes are mildly elevated but improved.  His LDL cholesterol is improved.  He still drinks excessive amounts of alcohol couple times a month on business trips for his industry.  He is in the food and liquor industry.  But he is not drinking at home very often.  He states he will lose control of his alcohol use business trips.  But he does not have this trouble at home.    Objective   Vital Signs:  /86   Pulse 96   Temp 95.7 °F (35.4 °C)   Ht 182.9 cm (72\")   Wt 98.6 kg (217 lb 4.8 oz)   SpO2 97%   BMI 29.47 kg/m²   Estimated body mass index is 29.47 kg/m² as calculated from the following:    Height as of this encounter: 182.9 cm (72\").    Weight as of this encounter: 98.6 kg (217 lb 4.8 oz).          Physical Exam  Constitutional:       Appearance: Normal appearance.   Cardiovascular:      Rate and Rhythm: Normal rate.   Pulmonary:      Effort: Pulmonary effort is normal.   Abdominal:      Tenderness: There is no abdominal tenderness.      Comments: Right upper quadrant point-of-care ultrasound.  No gross lesions of the right lobe of the liver.  No definitive cholelithiasis.  The echogenicity of the liver parenchyma is increased suggestive of fatty liver.  The right kidney appears unremarkable.   Neurological:      Mental Status: He is alert.        Result Review :{Labs  Result Review  Imaging  Med Tab  Media  Procedures  :23}                Assessment and Plan   Diagnoses and all orders for this visit:    1. Fatty liver (Primary)  -     CBC & Differential; Future  -     Comprehensive Metabolic Panel; Future  -     Lipid " Panel; Future    2. Elevated blood pressure reading      Fatty liver is a likely cause of elevated liver enzymes.  Previous hepatitis C testing negative.  Likely combination of metabolic associated fatty liver disease and alcohol use.  The liver enzymes are improved and his alcohol use is somewhat less.  He does have risky drinking related to work-related outings.  Counseling given in great detail with regards to alcohol cessation and/or moderation.     Elevated blood pressure reading.  He is going to check his blood pressure at home.  I will see him in 6 months.  For annual complete physical examination with lab work prior.         Follow Up   No follow-ups on file.  Patient was given instructions and counseling regarding his condition or for health maintenance advice. Please see specific information pulled into the AVS if appropriate.

## 2022-09-09 ENCOUNTER — APPOINTMENT (OUTPATIENT)
Dept: OTHER | Facility: HOSPITAL | Age: 42
End: 2022-09-09

## 2022-09-16 ENCOUNTER — APPOINTMENT (OUTPATIENT)
Dept: OTHER | Facility: HOSPITAL | Age: 42
End: 2022-09-16